# Patient Record
Sex: FEMALE | URBAN - METROPOLITAN AREA
[De-identification: names, ages, dates, MRNs, and addresses within clinical notes are randomized per-mention and may not be internally consistent; named-entity substitution may affect disease eponyms.]

---

## 2023-06-30 ENCOUNTER — OFFICE VISIT (OUTPATIENT)
Dept: URBAN - METROPOLITAN AREA CLINIC 64 | Facility: LOCATION | Age: 78
End: 2023-06-30
Payer: COMMERCIAL

## 2023-06-30 DIAGNOSIS — H43.393 OTHER VITREOUS OPACITIES, BILATERAL: Primary | ICD-10-CM

## 2023-06-30 DIAGNOSIS — Z96.1 PRESENCE OF INTRAOCULAR LENS: ICD-10-CM

## 2023-06-30 PROCEDURE — 99204 OFFICE O/P NEW MOD 45 MIN: CPT | Performed by: OPTOMETRIST

## 2023-06-30 ASSESSMENT — INTRAOCULAR PRESSURE
OS: 19
OD: 17

## 2023-06-30 ASSESSMENT — KERATOMETRY
OD: 45.70
OS: 46.10

## 2023-06-30 ASSESSMENT — VISUAL ACUITY
OS: 20/25
OD: 20/25

## 2023-06-30 NOTE — IMPRESSION/PLAN
Impression: Presence of intraocular lens: Z96.1. Plan: S/p CE/IOL OU, lens well positioned and clear. Monitor with yearly eye exams.